# Patient Record
Sex: FEMALE | Race: WHITE | Employment: FULL TIME | URBAN - METROPOLITAN AREA
[De-identification: names, ages, dates, MRNs, and addresses within clinical notes are randomized per-mention and may not be internally consistent; named-entity substitution may affect disease eponyms.]

---

## 2024-05-12 ENCOUNTER — APPOINTMENT (OUTPATIENT)
Dept: GENERAL RADIOLOGY | Age: 59
End: 2024-05-12
Payer: COMMERCIAL

## 2024-05-12 ENCOUNTER — HOSPITAL ENCOUNTER (EMERGENCY)
Age: 59
Discharge: HOME | End: 2024-05-12
Attending: EMERGENCY MEDICINE
Payer: COMMERCIAL

## 2024-05-12 VITALS
OXYGEN SATURATION: 99 % | BODY MASS INDEX: 28.16 KG/M2 | TEMPERATURE: 98.4 F | RESPIRATION RATE: 16 BRPM | DIASTOLIC BLOOD PRESSURE: 91 MMHG | WEIGHT: 153 LBS | HEART RATE: 81 BPM | HEIGHT: 62 IN | SYSTOLIC BLOOD PRESSURE: 123 MMHG

## 2024-05-12 DIAGNOSIS — S66.911A STRAIN OF RIGHT WRIST, INITIAL ENCOUNTER: Primary | ICD-10-CM

## 2024-05-12 PROCEDURE — 73110 X-RAY EXAM OF WRIST: CPT

## 2024-05-12 PROCEDURE — 6370000000 HC RX 637 (ALT 250 FOR IP): Performed by: EMERGENCY MEDICINE

## 2024-05-12 PROCEDURE — 99283 EMERGENCY DEPT VISIT LOW MDM: CPT

## 2024-05-12 PROCEDURE — 29125 APPL SHORT ARM SPLINT STATIC: CPT

## 2024-05-12 RX ORDER — NAPROXEN 250 MG/1
500 TABLET ORAL ONCE
Status: COMPLETED | OUTPATIENT
Start: 2024-05-12 | End: 2024-05-12

## 2024-05-12 RX ORDER — ACETAMINOPHEN 500 MG
1000 TABLET ORAL ONCE
Status: COMPLETED | OUTPATIENT
Start: 2024-05-12 | End: 2024-05-12

## 2024-05-12 RX ORDER — NAPROXEN 500 MG/1
500 TABLET ORAL 2 TIMES DAILY WITH MEALS
Qty: 60 TABLET | Refills: 0 | Status: SHIPPED | OUTPATIENT
Start: 2024-05-12

## 2024-05-12 RX ADMIN — NAPROXEN 500 MG: 250 TABLET ORAL at 19:23

## 2024-05-12 RX ADMIN — ACETAMINOPHEN 1000 MG: 500 TABLET ORAL at 19:23

## 2024-05-12 ASSESSMENT — PAIN - FUNCTIONAL ASSESSMENT: PAIN_FUNCTIONAL_ASSESSMENT: 0-10

## 2024-05-12 ASSESSMENT — PAIN SCALES - GENERAL: PAINLEVEL_OUTOF10: 8

## 2024-05-12 ASSESSMENT — PAIN DESCRIPTION - LOCATION: LOCATION: HAND;WRIST

## 2024-05-12 NOTE — ED PROVIDER NOTES
Triage Chief Complaint:   Wrist Injury and Hand Injury (Right reports fell off the horse dismounting. )    [place]:  Tere Gardner is a 58 y.o. right-hand-dominant female that presents with right wrist injury.  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  Patient was informed that she had to kick her right leg up and over the horse and spin off and that she thinks she did it with \"too much glue still\".  Patient threw her cell from the horse and braced her fall with her right hand.  No head injury or loss of conscious.  Patient denies pain in any location other than the right wrist.  Pain is worse with palpation and is more so on the radial aspect of the wrist.  There is some radiation of pain into the hand and up into the forearm some from the wrist.  No numbness or weakness.    ROS:  General:  No fevers, no chills  Respiratory:  No shortness of breath  Neurologic:  No numbness, no weakness  Extremities:  No edema, + pain  Skin:  No rash  Psych: No axienty    History reviewed. No pertinent past medical history.  Past Surgical History:   Procedure Laterality Date    MARCIO-EN-Y GASTRIC BYPASS       History reviewed. No pertinent family history.  Social History     Socioeconomic History    Marital status:      Spouse name: Not on file    Number of children: Not on file    Years of education: Not on file    Highest education level: Not on file   Occupational History    Not on file   Tobacco Use    Smoking status: Never    Smokeless tobacco: Never   Substance and Sexual Activity    Alcohol use: Never    Drug use: Never    Sexual activity: Not on file   Other Topics Concern    Not on file   Social History Narrative    Not on file     Social Determinants of Health     Financial Resource Strain: Not on file   Food Insecurity: Not on file   Transportation Needs: Not on file   Physical Activity: Not on file

## 2024-06-03 ENCOUNTER — OFFICE VISIT (OUTPATIENT)
Dept: ORTHOPEDIC SURGERY | Age: 59
End: 2024-06-03
Payer: COMMERCIAL

## 2024-06-03 VITALS — HEART RATE: 91 BPM | OXYGEN SATURATION: 96 % | WEIGHT: 147 LBS | HEIGHT: 62 IN | BODY MASS INDEX: 27.05 KG/M2

## 2024-06-03 DIAGNOSIS — S63.501A SPRAIN OF RIGHT WRIST, INITIAL ENCOUNTER: Primary | ICD-10-CM

## 2024-06-03 PROCEDURE — 99203 OFFICE O/P NEW LOW 30 MIN: CPT | Performed by: STUDENT IN AN ORGANIZED HEALTH CARE EDUCATION/TRAINING PROGRAM

## 2024-06-03 RX ORDER — CITALOPRAM 40 MG/1
1 TABLET ORAL DAILY
COMMUNITY
Start: 2019-10-18

## 2024-06-03 RX ORDER — LEVOTHYROXINE SODIUM 88 UG/1
TABLET ORAL
COMMUNITY
Start: 2019-10-18

## 2024-06-03 RX ORDER — BUSPIRONE HYDROCHLORIDE 10 MG/1
TABLET ORAL
COMMUNITY
Start: 2024-03-28

## 2024-06-03 ASSESSMENT — ENCOUNTER SYMPTOMS
COLOR CHANGE: 0
WHEEZING: 0
BACK PAIN: 0
NAUSEA: 0
SHORTNESS OF BREATH: 0
COUGH: 0
SORE THROAT: 0
DIARRHEA: 0
VOMITING: 0

## 2024-06-03 NOTE — PATIENT INSTRUCTIONS
Home exercises given today.   Formal physical therapy ordered.  Compound cream prescription faxed to [facility].  Please order a thumb spica brace.   Use ice and over the counter pain medication as needed for pain.  Follow up in 4 weeks.

## 2024-06-03 NOTE — PROGRESS NOTES
Patient is a 58 y.o. year old female. Patient is in the office today with RT wrist injury. Patient states that she injured themselves by falling off of a horse. Patient states that the injury happened 5/12/24. She was seen in the [place] ED and x-rays were taken.  Pain scale  4/10. She has been taking Advil for pain. Her pain is along the thumb and the hypothenar eminence. She was seen was seen by Dr. Starr Vasquez, a chiropractor, on 5/16/24 and had adjustments of her RT shoulder and neck. Denies a prior hx of injury or surgery. Denies any new falls or injuries.   Occupation: , typing, works from home  Dominant Hand: Right      
horse.  She can continue ice over-the-counter pain medication for pain control.  All questions were answered and patient voices her understanding.    Electronically signed by Papito Danielle DO on 6/3/2024 at 4:00 PM

## 2025-08-11 ENCOUNTER — AMBULATORY SURGICAL CENTER (OUTPATIENT)
Dept: URBAN - METROPOLITAN AREA SURGERY 7 | Facility: SURGERY | Age: 60
End: 2025-08-11
Payer: COMMERCIAL

## 2025-08-11 ENCOUNTER — OFFICE (OUTPATIENT)
Dept: URBAN - METROPOLITAN AREA PATHOLOGY 1 | Facility: PATHOLOGY | Age: 60
End: 2025-08-11
Payer: COMMERCIAL

## 2025-08-11 VITALS
TEMPERATURE: 97.8 F | SYSTOLIC BLOOD PRESSURE: 116 MMHG | DIASTOLIC BLOOD PRESSURE: 76 MMHG | DIASTOLIC BLOOD PRESSURE: 88 MMHG | HEIGHT: 62 IN | RESPIRATION RATE: 17 BRPM | SYSTOLIC BLOOD PRESSURE: 149 MMHG | OXYGEN SATURATION: 99 % | OXYGEN SATURATION: 95 % | RESPIRATION RATE: 26 BRPM | SYSTOLIC BLOOD PRESSURE: 132 MMHG | HEART RATE: 95 BPM | HEART RATE: 81 BPM | SYSTOLIC BLOOD PRESSURE: 127 MMHG | DIASTOLIC BLOOD PRESSURE: 77 MMHG | DIASTOLIC BLOOD PRESSURE: 90 MMHG | RESPIRATION RATE: 18 BRPM | DIASTOLIC BLOOD PRESSURE: 82 MMHG | HEART RATE: 86 BPM | SYSTOLIC BLOOD PRESSURE: 114 MMHG | DIASTOLIC BLOOD PRESSURE: 96 MMHG | OXYGEN SATURATION: 98 % | SYSTOLIC BLOOD PRESSURE: 150 MMHG | OXYGEN SATURATION: 88 % | SYSTOLIC BLOOD PRESSURE: 128 MMHG | DIASTOLIC BLOOD PRESSURE: 75 MMHG | OXYGEN SATURATION: 93 % | SYSTOLIC BLOOD PRESSURE: 121 MMHG | HEART RATE: 97 BPM | OXYGEN SATURATION: 94 % | WEIGHT: 135 LBS | HEART RATE: 92 BPM | HEART RATE: 93 BPM | HEART RATE: 74 BPM | RESPIRATION RATE: 21 BRPM

## 2025-08-11 DIAGNOSIS — R19.4 CHANGE IN BOWEL HABIT: ICD-10-CM

## 2025-08-11 PROCEDURE — 45380 COLONOSCOPY AND BIOPSY: CPT | Performed by: INTERNAL MEDICINE

## 2025-08-11 PROCEDURE — 88305 TISSUE EXAM BY PATHOLOGIST: CPT | Performed by: PATHOLOGY

## 2025-08-14 LAB — PDF: PDF REPORT: (no result)
